# Patient Record
Sex: FEMALE | Employment: UNEMPLOYED | URBAN - NONMETROPOLITAN AREA
[De-identification: names, ages, dates, MRNs, and addresses within clinical notes are randomized per-mention and may not be internally consistent; named-entity substitution may affect disease eponyms.]

---

## 2023-04-18 ENCOUNTER — APPOINTMENT (OUTPATIENT)
Age: 53
End: 2023-04-18
Payer: COMMERCIAL

## 2023-04-21 ENCOUNTER — APPOINTMENT (OUTPATIENT)
Age: 53
End: 2023-04-21
Payer: COMMERCIAL

## 2023-10-12 ENCOUNTER — HOSPITAL ENCOUNTER (OUTPATIENT)
Age: 53
Setting detail: RECURRING SERIES
Discharge: HOME | End: 2023-10-15
Payer: COMMERCIAL

## 2023-10-12 PROCEDURE — 97161 PT EVAL LOW COMPLEX 20 MIN: CPT | Performed by: PHYSICAL THERAPIST

## 2023-10-12 NOTE — THERAPY EVALUATION
THORACOLUMBAR EVAL/ PT DAILY TREATMENT NOTE 10-18    Patient Name: Dylon Bruner  Date:10/12/2023  : 1970  [x]  Patient  Verified  Payor: Tati Garay / Plan: [employer] / Product Type: *No Product type* /    In time:10:52 AM  Out time:11:42  Total Treatment Time (min): 50  Visit #: 1 of 12    Medicare/BCBS Only   Total Timed Codes (min):  50 1:1 Treatment Time:  50     Treatment Area: Dorsalgia, unspecified [M54.9]      Subjective:     Pt coming to clinic today with complaints of lumbar pain with left hip (joint like pain) with intermittent radicular symptoms into bilateral lower extremities (mostly Right). Started physical therapy with us back in 2023 and states she is now better after initial muscle energy techniques to address asymmetry and exercises to work on stability and flexibility. Wants to progress with exercises this go around as she only had 2 sessions then. Assessing symmetry today no issues, everything equal bilaterally apparent and true measurements. Discusses at length modifications for driving/sleeping/work to help prevent exacerbation.         Pain Level (0-10 scale): Current: 2/10  At worst: 7/10;   []Sharp  [x]Dull  [x]Achy []Burning []Throbbing []N&T []Other:  []constant [x]intermittent []improving []worsening []no change since onset  Incr sx with: twisting, lifting away from body, sitting  Decr sx with:  stretching HS, doing exercises, being active        Any medication changes, allergies to medications, adverse drug reactions, diagnosis change, or new procedure performed?: [x] No    [] Yes (see summary sheet for update)        Patient Goals: Decrease pain  Previous Treatment/Compliance: only 2 treatments then had an issue where she could not come  Barriers: []pain []financial []time []transportation []other  Motivation: Good  Substance use: []Alcohol []Tobacco []other:   Modified Oswestry Score: 23.7  FOTO: 56        Past History/Treatments:   Past Medical History

## 2023-10-17 ENCOUNTER — HOSPITAL ENCOUNTER (OUTPATIENT)
Age: 53
Setting detail: RECURRING SERIES
Discharge: HOME | End: 2023-10-20
Payer: COMMERCIAL

## 2023-10-17 PROCEDURE — 97110 THERAPEUTIC EXERCISES: CPT

## 2023-10-20 ENCOUNTER — HOSPITAL ENCOUNTER (OUTPATIENT)
Age: 53
Setting detail: RECURRING SERIES
Discharge: HOME | End: 2023-10-23
Payer: COMMERCIAL

## 2023-10-20 PROCEDURE — 97110 THERAPEUTIC EXERCISES: CPT

## 2023-10-24 ENCOUNTER — HOSPITAL ENCOUNTER (OUTPATIENT)
Age: 53
Setting detail: RECURRING SERIES
Discharge: HOME | End: 2023-10-27
Payer: COMMERCIAL

## 2023-10-24 PROCEDURE — 97110 THERAPEUTIC EXERCISES: CPT

## 2023-10-24 PROCEDURE — 97112 NEUROMUSCULAR REEDUCATION: CPT

## 2023-10-27 ENCOUNTER — HOSPITAL ENCOUNTER (OUTPATIENT)
Age: 53
Setting detail: RECURRING SERIES
Discharge: HOME | End: 2023-10-30
Payer: COMMERCIAL

## 2023-10-27 PROCEDURE — 97110 THERAPEUTIC EXERCISES: CPT

## 2023-10-30 ENCOUNTER — APPOINTMENT (OUTPATIENT)
Age: 53
End: 2023-10-30
Payer: COMMERCIAL

## 2023-11-15 ENCOUNTER — HOSPITAL ENCOUNTER (OUTPATIENT)
Age: 53
Setting detail: RECURRING SERIES
Discharge: HOME | End: 2023-11-18

## 2023-12-06 ENCOUNTER — HOSPITAL ENCOUNTER (OUTPATIENT)
Age: 53
Setting detail: RECURRING SERIES
Discharge: HOME | End: 2023-12-09
Payer: COMMERCIAL

## 2023-12-06 PROCEDURE — 97530 THERAPEUTIC ACTIVITIES: CPT

## 2023-12-06 PROCEDURE — 97110 THERAPEUTIC EXERCISES: CPT

## 2023-12-06 NOTE — THERAPY RECERTIFICATION
[facility], [facility] [place], [address]  Phone: [phone]    Fax: [phone]   Progress Note/CONTINUED PLAN OF CARE for PHYSICAL THERAPY          Patient Name: Franky Ojeda : 1970   Treatment/Medical Diagnosis: Dorsalgia, unspecified [M54.9]   Onset Date: 2023    [facility] 10/12[facility]   Prior Hospitalization: See Medical History Provider #: [id number]   Prior Level of Function: Ind with all ASLs   Comorbidities: See chart   Medications: Verified on Patient Summary List   [facility] from [facility]: 6 Missed Visits: 0     Subjective: Pt states she was carrying 4 gallons of water about 2-3 weeks ago and she said now she has pain in both sides of low back. She also reports numbness/tingling in RLE that stop at knee. Said she is cleared by MD to do PT after surgery. Said stretches were helping before but she has not done them in weeks.      Past Medical History:   Diagnosis Date    Cancer ([address] [facility])     Breast         Objective:  Posture:  Lateral Shift:    [] R    [] L     [] +  [] -  Kyphosis:        [] Increased   [] Decreased   []  WNL  Lordosis:         [x] Increased   [] Decreased   [] WNL  Pelvic symmetry: [x] WNL      [] Other:     Gait:                [x] Normal       [] Abnormal:     Active Movements: [] N/A   [] Too acute   [] Other:  ROM % AROM % PROM Comments:pain, area   Forward flexion 40-60 80%      Extension 20-30 50%      SB right 20-30 80%      SB left 20-30 80%   Pain left hip   Rotation right 5-10 100       Rotation left 5-10 100         R   Neuro Screen [x] WNL  Myotome/Dermatome/Reflexes:  Comments:     Dural Mobility:  SLR Sitting:     [] R    [] L    [] +    [x] -  @ (degrees):           Supine:    [] R    [] L    [] +    [x] -  @ (degrees):   Slump Test:     [] R    [] L    [] +    [x] -  @ (degrees):   Prone Knee Bend:      [] R    [] L    [] +    [] -

## 2023-12-11 ENCOUNTER — HOSPITAL ENCOUNTER (OUTPATIENT)
Age: 53
Setting detail: RECURRING SERIES
Discharge: HOME | End: 2023-12-14
Payer: COMMERCIAL

## 2023-12-11 PROCEDURE — 97110 THERAPEUTIC EXERCISES: CPT

## 2023-12-13 ENCOUNTER — HOSPITAL ENCOUNTER (OUTPATIENT)
Age: 53
Setting detail: RECURRING SERIES
Discharge: HOME | End: 2023-12-16
Payer: COMMERCIAL

## 2023-12-13 PROCEDURE — 97110 THERAPEUTIC EXERCISES: CPT

## 2023-12-27 ENCOUNTER — APPOINTMENT (OUTPATIENT)
Age: 53
End: 2023-12-27
Payer: COMMERCIAL